# Patient Record
Sex: MALE | Race: WHITE | Employment: OTHER | URBAN - METROPOLITAN AREA
[De-identification: names, ages, dates, MRNs, and addresses within clinical notes are randomized per-mention and may not be internally consistent; named-entity substitution may affect disease eponyms.]

---

## 2020-07-27 DIAGNOSIS — M00.9 INFECTION OF LEFT KNEE (HCC): ICD-10-CM

## 2020-07-27 DIAGNOSIS — I96 GANGRENE OF LEFT FOOT (HCC): ICD-10-CM

## 2020-07-27 DIAGNOSIS — L03.116 CELLULITIS OF LEFT KNEE: ICD-10-CM

## 2020-07-27 DIAGNOSIS — I73.9 PERIPHERAL VASCULAR DISEASE (HCC): ICD-10-CM

## 2020-07-27 PROBLEM — E11.9 DIABETES (HCC): Status: ACTIVE | Noted: 2020-07-27

## 2020-07-27 PROBLEM — I10 HYPERTENSION: Status: ACTIVE | Noted: 2020-07-27

## 2020-07-27 RX ORDER — ATORVASTATIN CALCIUM 80 MG/1
80 TABLET, FILM COATED ORAL DAILY
COMMUNITY

## 2020-07-27 RX ORDER — FINASTERIDE 1 MG/1
1 TABLET, FILM COATED ORAL DAILY
COMMUNITY

## 2020-07-27 RX ORDER — M-VIT,TX,IRON,MINS/CALC/FOLIC 27MG-0.4MG
1 TABLET ORAL DAILY
COMMUNITY

## 2020-07-27 RX ORDER — ASCORBIC ACID 500 MG
1000 TABLET ORAL 2 TIMES DAILY
COMMUNITY

## 2020-07-27 RX ORDER — ASPIRIN 81 MG/1
81 TABLET ORAL DAILY
COMMUNITY

## 2020-07-27 RX ORDER — GLIPIZIDE AND METFORMIN HCL 5; 500 MG/1; MG/1
1 TABLET, FILM COATED ORAL
COMMUNITY

## 2020-07-27 RX ORDER — IBUPROFEN 600 MG/1
600 TABLET ORAL EVERY 8 HOURS PRN
COMMUNITY

## 2020-07-27 RX ORDER — LISINOPRIL AND HYDROCHLOROTHIAZIDE 12.5; 1 MG/1; MG/1
1 TABLET ORAL DAILY
COMMUNITY

## 2020-07-27 RX ORDER — TAMSULOSIN HYDROCHLORIDE 0.4 MG/1
0.4 CAPSULE ORAL NIGHTLY
COMMUNITY

## 2023-04-15 ENCOUNTER — APPOINTMENT (OUTPATIENT)
Dept: GENERAL RADIOLOGY | Age: 78
DRG: 918 | End: 2023-04-15
Payer: MEDICARE

## 2023-04-15 ENCOUNTER — HOSPITAL ENCOUNTER (INPATIENT)
Age: 78
LOS: 1 days | Discharge: HOME | DRG: 918 | End: 2023-04-17
Attending: EMERGENCY MEDICINE | Admitting: STUDENT IN AN ORGANIZED HEALTH CARE EDUCATION/TRAINING PROGRAM
Payer: MEDICARE

## 2023-04-15 DIAGNOSIS — N17.9 AKI (ACUTE KIDNEY INJURY) (HCC): ICD-10-CM

## 2023-04-15 DIAGNOSIS — R55 SYNCOPE AND COLLAPSE: Primary | ICD-10-CM

## 2023-04-15 DIAGNOSIS — J90 PLEURAL EFFUSION: ICD-10-CM

## 2023-04-15 LAB
ABSOLUTE EOS #: 0.18 K/UL (ref 0–0.44)
ABSOLUTE IMMATURE GRANULOCYTE: 0.05 K/UL (ref 0–0.3)
ABSOLUTE LYMPH #: 1.1 K/UL (ref 1.1–3.7)
ABSOLUTE MONO #: 0.75 K/UL (ref 0.1–1.2)
ALBUMIN SERPL-MCNC: 3.8 G/DL (ref 3.5–5.2)
ALBUMIN/GLOBULIN RATIO: 1.5 (ref 1–2.5)
ALP SERPL-CCNC: 87 U/L (ref 40–129)
ALT SERPL-CCNC: 9 U/L (ref 5–41)
ANION GAP SERPL CALCULATED.3IONS-SCNC: 12 MMOL/L (ref 9–17)
AST SERPL-CCNC: 15 U/L
BASOPHILS # BLD: 0 % (ref 0–2)
BASOPHILS ABSOLUTE: <0.03 K/UL (ref 0–0.2)
BILIRUB SERPL-MCNC: 0.3 MG/DL (ref 0.3–1.2)
BNP SERPL-MCNC: 1046 PG/ML
BUN SERPL-MCNC: 22 MG/DL (ref 8–23)
CALCIUM SERPL-MCNC: 8.4 MG/DL (ref 8.6–10.4)
CHLORIDE SERPL-SCNC: 101 MMOL/L (ref 98–107)
CO2 SERPL-SCNC: 23 MMOL/L (ref 20–31)
CREAT SERPL-MCNC: 1.63 MG/DL (ref 0.7–1.2)
D DIMER BLD IA.RAPID-MCNC: <0.27 MG/L FEU (ref 0–0.57)
EOSINOPHILS RELATIVE PERCENT: 2 % (ref 1–4)
GFR SERPL CREATININE-BSD FRML MDRD: 43 ML/MIN/1.73M2
GLUCOSE BLD-MCNC: 100 MG/DL (ref 75–110)
GLUCOSE SERPL-MCNC: 188 MG/DL (ref 70–99)
HCT VFR BLD AUTO: 39.4 % (ref 40.7–50.3)
HGB BLD-MCNC: 12.9 G/DL (ref 13–17)
IMMATURE GRANULOCYTES: 1 %
LYMPHOCYTES # BLD: 14 % (ref 24–43)
MAGNESIUM SERPL-MCNC: 2.2 MG/DL (ref 1.6–2.6)
MCH RBC QN AUTO: 30.8 PG (ref 25.2–33.5)
MCHC RBC AUTO-ENTMCNC: 32.7 G/DL (ref 28.4–34.8)
MCV RBC AUTO: 94 FL (ref 82.6–102.9)
MONOCYTES # BLD: 10 % (ref 3–12)
NRBC AUTOMATED: 0 PER 100 WBC
PDW BLD-RTO: 12.2 % (ref 11.8–14.4)
PLATELET # BLD AUTO: 249 K/UL (ref 138–453)
PMV BLD AUTO: 10.7 FL (ref 8.1–13.5)
POTASSIUM SERPL-SCNC: 4.4 MMOL/L (ref 3.7–5.3)
PROT SERPL-MCNC: 6.3 G/DL (ref 6.4–8.3)
RBC # BLD: 4.19 M/UL (ref 4.21–5.77)
SEG NEUTROPHILS: 73 % (ref 36–65)
SEGMENTED NEUTROPHILS ABSOLUTE COUNT: 5.65 K/UL (ref 1.5–8.1)
SODIUM SERPL-SCNC: 136 MMOL/L (ref 135–144)
T4 FREE SERPL-MCNC: 1.2 NG/DL (ref 0.9–1.7)
TROPONIN I SERPL DL<=0.01 NG/ML-MCNC: 38 NG/L (ref 0–22)
TROPONIN I SERPL DL<=0.01 NG/ML-MCNC: 45 NG/L (ref 0–22)
TSH SERPL-ACNC: 5.56 UIU/ML (ref 0.3–5)
WBC # BLD AUTO: 7.8 K/UL (ref 3.5–11.3)

## 2023-04-15 PROCEDURE — 83880 ASSAY OF NATRIURETIC PEPTIDE: CPT

## 2023-04-15 PROCEDURE — 84439 ASSAY OF FREE THYROXINE: CPT

## 2023-04-15 PROCEDURE — 85025 COMPLETE CBC W/AUTO DIFF WBC: CPT

## 2023-04-15 PROCEDURE — 82947 ASSAY GLUCOSE BLOOD QUANT: CPT

## 2023-04-15 PROCEDURE — 84443 ASSAY THYROID STIM HORMONE: CPT

## 2023-04-15 PROCEDURE — 85379 FIBRIN DEGRADATION QUANT: CPT

## 2023-04-15 PROCEDURE — 93005 ELECTROCARDIOGRAM TRACING: CPT | Performed by: STUDENT IN AN ORGANIZED HEALTH CARE EDUCATION/TRAINING PROGRAM

## 2023-04-15 PROCEDURE — 71046 X-RAY EXAM CHEST 2 VIEWS: CPT

## 2023-04-15 PROCEDURE — 80053 COMPREHEN METABOLIC PANEL: CPT

## 2023-04-15 PROCEDURE — 84484 ASSAY OF TROPONIN QUANT: CPT

## 2023-04-15 PROCEDURE — 99285 EMERGENCY DEPT VISIT HI MDM: CPT

## 2023-04-15 PROCEDURE — 83735 ASSAY OF MAGNESIUM: CPT

## 2023-04-15 ASSESSMENT — PAIN - FUNCTIONAL ASSESSMENT: PAIN_FUNCTIONAL_ASSESSMENT: NONE - DENIES PAIN

## 2023-04-16 ENCOUNTER — APPOINTMENT (OUTPATIENT)
Dept: CT IMAGING | Age: 78
DRG: 918 | End: 2023-04-16
Payer: MEDICARE

## 2023-04-16 PROBLEM — I10 ESSENTIAL HYPERTENSION: Status: ACTIVE | Noted: 2023-04-16

## 2023-04-16 PROBLEM — Z86.79 HISTORY OF ATRIAL FIBRILLATION: Status: ACTIVE | Noted: 2023-04-16

## 2023-04-16 PROBLEM — J43.2 CENTRILOBULAR EMPHYSEMA (HCC): Status: ACTIVE | Noted: 2023-04-16

## 2023-04-16 PROBLEM — R55 SYNCOPE AND COLLAPSE: Status: ACTIVE | Noted: 2023-04-16

## 2023-04-16 LAB
ABSOLUTE RETIC #: 0.06 M/UL (ref 0.03–0.08)
AMPHETAMINE SCREEN URINE: NEGATIVE
BACTERIA: ABNORMAL
BARBITURATE SCREEN URINE: NEGATIVE
BENZODIAZEPINE SCREEN, URINE: NEGATIVE
BILIRUBIN URINE: NEGATIVE
BNP SERPL-MCNC: 762 PG/ML
CANNABINOID SCREEN URINE: POSITIVE
COCAINE METABOLITE, URINE: POSITIVE
COLOR: YELLOW
EPITHELIAL CELLS UA: ABNORMAL /HPF (ref 0–5)
FENTANYL URINE: NEGATIVE
FERRITIN SERPL-MCNC: 91 NG/ML (ref 30–400)
GLUCOSE BLD-MCNC: 136 MG/DL (ref 75–110)
GLUCOSE BLD-MCNC: 159 MG/DL (ref 75–110)
GLUCOSE BLD-MCNC: 199 MG/DL (ref 75–110)
GLUCOSE BLD-MCNC: 228 MG/DL (ref 75–110)
GLUCOSE UR STRIP.AUTO-MCNC: ABNORMAL MG/DL
IMMATURE RETIC FRACT: 8.6 % (ref 2.7–18.3)
IRON SATURATION: 30 % (ref 20–55)
IRON SERPL-MCNC: 79 UG/DL (ref 59–158)
KETONES UR STRIP.AUTO-MCNC: NEGATIVE MG/DL
LEUKOCYTE ESTERASE UR QL STRIP.AUTO: ABNORMAL
METHADONE SCREEN, URINE: NEGATIVE
NITRITE UR QL STRIP.AUTO: POSITIVE
OPIATES, URINE: NEGATIVE
OXYCODONE SCREEN URINE: NEGATIVE
PHENCYCLIDINE, URINE: NEGATIVE
PROT UR STRIP.AUTO-MCNC: 6.5 MG/DL (ref 5–8)
PROT UR STRIP.AUTO-MCNC: ABNORMAL MG/DL
RBC CLUMPS #/AREA URNS AUTO: ABNORMAL /HPF (ref 0–4)
RETIC HEMOGLOBIN: 35.8 PG (ref 28.2–35.7)
RETICS/RBC NFR AUTO: 1.3 % (ref 0.5–1.9)
SPECIFIC GRAVITY UA: 1.03 (ref 1–1.03)
TEST INFORMATION: ABNORMAL
TIBC SERPL-MCNC: 264 UG/DL (ref 250–450)
TURBIDITY: ABNORMAL
UNSATURATED IRON BINDING CAPACITY: 185 UG/DL (ref 112–347)
URINE HGB: ABNORMAL
UROBILINOGEN, URINE: NORMAL
WBC UA: ABNORMAL /HPF (ref 0–5)

## 2023-04-16 PROCEDURE — 70450 CT HEAD/BRAIN W/O DYE: CPT

## 2023-04-16 PROCEDURE — 83880 ASSAY OF NATRIURETIC PEPTIDE: CPT

## 2023-04-16 PROCEDURE — 6370000000 HC RX 637 (ALT 250 FOR IP): Performed by: STUDENT IN AN ORGANIZED HEALTH CARE EDUCATION/TRAINING PROGRAM

## 2023-04-16 PROCEDURE — 36415 COLL VENOUS BLD VENIPUNCTURE: CPT

## 2023-04-16 PROCEDURE — 81001 URINALYSIS AUTO W/SCOPE: CPT

## 2023-04-16 PROCEDURE — 2580000003 HC RX 258: Performed by: NURSE PRACTITIONER

## 2023-04-16 PROCEDURE — 2060000000 HC ICU INTERMEDIATE R&B

## 2023-04-16 PROCEDURE — 82728 ASSAY OF FERRITIN: CPT

## 2023-04-16 PROCEDURE — 83540 ASSAY OF IRON: CPT

## 2023-04-16 PROCEDURE — 82947 ASSAY GLUCOSE BLOOD QUANT: CPT

## 2023-04-16 PROCEDURE — 87186 SC STD MICRODIL/AGAR DIL: CPT

## 2023-04-16 PROCEDURE — 6360000002 HC RX W HCPCS: Performed by: NURSE PRACTITIONER

## 2023-04-16 PROCEDURE — 87077 CULTURE AEROBIC IDENTIFY: CPT

## 2023-04-16 PROCEDURE — 85045 AUTOMATED RETICULOCYTE COUNT: CPT

## 2023-04-16 PROCEDURE — 99222 1ST HOSP IP/OBS MODERATE 55: CPT | Performed by: STUDENT IN AN ORGANIZED HEALTH CARE EDUCATION/TRAINING PROGRAM

## 2023-04-16 PROCEDURE — 93005 ELECTROCARDIOGRAM TRACING: CPT | Performed by: NURSE PRACTITIONER

## 2023-04-16 PROCEDURE — 83550 IRON BINDING TEST: CPT

## 2023-04-16 PROCEDURE — 87086 URINE CULTURE/COLONY COUNT: CPT

## 2023-04-16 PROCEDURE — 87040 BLOOD CULTURE FOR BACTERIA: CPT

## 2023-04-16 PROCEDURE — 80307 DRUG TEST PRSMV CHEM ANLYZR: CPT

## 2023-04-16 RX ORDER — SODIUM CHLORIDE 9 MG/ML
INJECTION, SOLUTION INTRAVENOUS CONTINUOUS
Status: DISCONTINUED | OUTPATIENT
Start: 2023-04-16 | End: 2023-04-16

## 2023-04-16 RX ORDER — INSULIN LISPRO 100 [IU]/ML
0-8 INJECTION, SOLUTION INTRAVENOUS; SUBCUTANEOUS
Status: DISCONTINUED | OUTPATIENT
Start: 2023-04-16 | End: 2023-04-17 | Stop reason: HOSPADM

## 2023-04-16 RX ORDER — IPRATROPIUM BROMIDE AND ALBUTEROL SULFATE 2.5; .5 MG/3ML; MG/3ML
1 SOLUTION RESPIRATORY (INHALATION) EVERY 4 HOURS PRN
Status: DISCONTINUED | OUTPATIENT
Start: 2023-04-16 | End: 2023-04-17 | Stop reason: HOSPADM

## 2023-04-16 RX ORDER — FLUDROCORTISONE ACETATE 0.1 MG/1
0.1 TABLET ORAL DAILY
Status: DISCONTINUED | OUTPATIENT
Start: 2023-04-16 | End: 2023-04-17 | Stop reason: HOSPADM

## 2023-04-16 RX ORDER — INSULIN LISPRO 100 [IU]/ML
0-4 INJECTION, SOLUTION INTRAVENOUS; SUBCUTANEOUS NIGHTLY
Status: DISCONTINUED | OUTPATIENT
Start: 2023-04-16 | End: 2023-04-17 | Stop reason: HOSPADM

## 2023-04-16 RX ORDER — POLYETHYLENE GLYCOL 3350 17 G/17G
17 POWDER, FOR SOLUTION ORAL DAILY PRN
Status: DISCONTINUED | OUTPATIENT
Start: 2023-04-16 | End: 2023-04-17 | Stop reason: HOSPADM

## 2023-04-16 RX ORDER — SODIUM CHLORIDE 9 MG/ML
INJECTION, SOLUTION INTRAVENOUS PRN
Status: DISCONTINUED | OUTPATIENT
Start: 2023-04-16 | End: 2023-04-17 | Stop reason: HOSPADM

## 2023-04-16 RX ORDER — DEXTROSE MONOHYDRATE 100 MG/ML
INJECTION, SOLUTION INTRAVENOUS CONTINUOUS PRN
Status: DISCONTINUED | OUTPATIENT
Start: 2023-04-16 | End: 2023-04-17 | Stop reason: HOSPADM

## 2023-04-16 RX ORDER — ENOXAPARIN SODIUM 100 MG/ML
40 INJECTION SUBCUTANEOUS DAILY
Status: DISCONTINUED | OUTPATIENT
Start: 2023-04-16 | End: 2023-04-17 | Stop reason: HOSPADM

## 2023-04-16 RX ORDER — ONDANSETRON 4 MG/1
4 TABLET, ORALLY DISINTEGRATING ORAL EVERY 8 HOURS PRN
Status: DISCONTINUED | OUTPATIENT
Start: 2023-04-16 | End: 2023-04-17 | Stop reason: HOSPADM

## 2023-04-16 RX ORDER — ACETAMINOPHEN 325 MG/1
650 TABLET ORAL EVERY 6 HOURS PRN
Status: DISCONTINUED | OUTPATIENT
Start: 2023-04-16 | End: 2023-04-17 | Stop reason: HOSPADM

## 2023-04-16 RX ORDER — SODIUM CHLORIDE 0.9 % (FLUSH) 0.9 %
10 SYRINGE (ML) INJECTION PRN
Status: DISCONTINUED | OUTPATIENT
Start: 2023-04-16 | End: 2023-04-17 | Stop reason: HOSPADM

## 2023-04-16 RX ORDER — ACETAMINOPHEN 650 MG/1
650 SUPPOSITORY RECTAL EVERY 6 HOURS PRN
Status: DISCONTINUED | OUTPATIENT
Start: 2023-04-16 | End: 2023-04-17 | Stop reason: HOSPADM

## 2023-04-16 RX ORDER — ATORVASTATIN CALCIUM 40 MG/1
40 TABLET, FILM COATED ORAL NIGHTLY
Status: DISCONTINUED | OUTPATIENT
Start: 2023-04-16 | End: 2023-04-17 | Stop reason: HOSPADM

## 2023-04-16 RX ORDER — POTASSIUM CHLORIDE 20 MEQ/1
40 TABLET, EXTENDED RELEASE ORAL PRN
Status: DISCONTINUED | OUTPATIENT
Start: 2023-04-16 | End: 2023-04-17 | Stop reason: HOSPADM

## 2023-04-16 RX ORDER — FINASTERIDE 5 MG/1
5 TABLET, FILM COATED ORAL DAILY
Status: DISCONTINUED | OUTPATIENT
Start: 2023-04-16 | End: 2023-04-17 | Stop reason: HOSPADM

## 2023-04-16 RX ORDER — POTASSIUM CHLORIDE 7.45 MG/ML
10 INJECTION INTRAVENOUS PRN
Status: DISCONTINUED | OUTPATIENT
Start: 2023-04-16 | End: 2023-04-17 | Stop reason: HOSPADM

## 2023-04-16 RX ORDER — SODIUM CHLORIDE 0.9 % (FLUSH) 0.9 %
5-40 SYRINGE (ML) INJECTION EVERY 12 HOURS SCHEDULED
Status: DISCONTINUED | OUTPATIENT
Start: 2023-04-16 | End: 2023-04-17 | Stop reason: HOSPADM

## 2023-04-16 RX ORDER — TAMSULOSIN HYDROCHLORIDE 0.4 MG/1
0.4 CAPSULE ORAL DAILY
Status: DISCONTINUED | OUTPATIENT
Start: 2023-04-16 | End: 2023-04-17 | Stop reason: HOSPADM

## 2023-04-16 RX ORDER — MAGNESIUM SULFATE 1 G/100ML
1000 INJECTION INTRAVENOUS PRN
Status: DISCONTINUED | OUTPATIENT
Start: 2023-04-16 | End: 2023-04-17 | Stop reason: HOSPADM

## 2023-04-16 RX ORDER — ONDANSETRON 2 MG/ML
4 INJECTION INTRAMUSCULAR; INTRAVENOUS EVERY 6 HOURS PRN
Status: DISCONTINUED | OUTPATIENT
Start: 2023-04-16 | End: 2023-04-17 | Stop reason: HOSPADM

## 2023-04-16 RX ADMIN — SODIUM CHLORIDE: 9 INJECTION, SOLUTION INTRAVENOUS at 04:09

## 2023-04-16 RX ADMIN — INSULIN LISPRO 2 UNITS: 100 INJECTION, SOLUTION INTRAVENOUS; SUBCUTANEOUS at 12:14

## 2023-04-16 RX ADMIN — TAMSULOSIN HYDROCHLORIDE 0.4 MG: 0.4 CAPSULE ORAL at 12:11

## 2023-04-16 RX ADMIN — FINASTERIDE 5 MG: 5 TABLET, FILM COATED ORAL at 12:11

## 2023-04-16 RX ADMIN — FLUDROCORTISONE ACETATE 0.1 MG: 0.1 TABLET ORAL at 16:40

## 2023-04-16 RX ADMIN — SODIUM CHLORIDE, PRESERVATIVE FREE 10 ML: 5 INJECTION INTRAVENOUS at 19:52

## 2023-04-16 RX ADMIN — DESMOPRESSIN ACETATE 40 MG: 0.2 TABLET ORAL at 19:52

## 2023-04-16 RX ADMIN — ENOXAPARIN SODIUM 40 MG: 100 INJECTION SUBCUTANEOUS at 08:47

## 2023-04-16 ASSESSMENT — ENCOUNTER SYMPTOMS
EYE PAIN: 0
SINUS PAIN: 0
COLOR CHANGE: 0
ABDOMINAL PAIN: 0
VOMITING: 0
ABDOMINAL DISTENTION: 0
COUGH: 0
BACK PAIN: 0
APNEA: 0
EYE REDNESS: 0
NAUSEA: 0
CHEST TIGHTNESS: 0
SHORTNESS OF BREATH: 0

## 2023-04-17 VITALS
HEART RATE: 72 BPM | RESPIRATION RATE: 18 BRPM | HEIGHT: 71 IN | DIASTOLIC BLOOD PRESSURE: 83 MMHG | TEMPERATURE: 97.3 F | SYSTOLIC BLOOD PRESSURE: 164 MMHG | WEIGHT: 210.1 LBS | BODY MASS INDEX: 29.41 KG/M2 | OXYGEN SATURATION: 98 %

## 2023-04-17 LAB
GLUCOSE BLD-MCNC: 167 MG/DL (ref 75–110)
GLUCOSE BLD-MCNC: 190 MG/DL (ref 75–110)
GLUCOSE BLD-MCNC: 238 MG/DL (ref 75–110)
MAGNESIUM SERPL-MCNC: 2.3 MG/DL (ref 1.6–2.6)

## 2023-04-17 PROCEDURE — 82947 ASSAY GLUCOSE BLOOD QUANT: CPT

## 2023-04-17 PROCEDURE — 99231 SBSQ HOSP IP/OBS SF/LOW 25: CPT | Performed by: STUDENT IN AN ORGANIZED HEALTH CARE EDUCATION/TRAINING PROGRAM

## 2023-04-17 PROCEDURE — 97165 OT EVAL LOW COMPLEX 30 MIN: CPT

## 2023-04-17 PROCEDURE — 6370000000 HC RX 637 (ALT 250 FOR IP): Performed by: STUDENT IN AN ORGANIZED HEALTH CARE EDUCATION/TRAINING PROGRAM

## 2023-04-17 PROCEDURE — 2580000003 HC RX 258: Performed by: NURSE PRACTITIONER

## 2023-04-17 PROCEDURE — 6360000002 HC RX W HCPCS: Performed by: NURSE PRACTITIONER

## 2023-04-17 PROCEDURE — 36415 COLL VENOUS BLD VENIPUNCTURE: CPT

## 2023-04-17 PROCEDURE — 83735 ASSAY OF MAGNESIUM: CPT

## 2023-04-17 RX ORDER — FINASTERIDE 5 MG/1
5 TABLET, FILM COATED ORAL DAILY
Qty: 30 TABLET | Refills: 3 | Status: SHIPPED | OUTPATIENT
Start: 2023-04-18

## 2023-04-17 RX ORDER — FLUDROCORTISONE ACETATE 0.1 MG/1
0.1 TABLET ORAL DAILY
Qty: 30 TABLET | Refills: 0 | Status: SHIPPED | OUTPATIENT
Start: 2023-04-18 | End: 2023-05-18

## 2023-04-17 RX ORDER — TAMSULOSIN HYDROCHLORIDE 0.4 MG/1
0.4 CAPSULE ORAL DAILY
Qty: 30 CAPSULE | Refills: 3 | Status: SHIPPED | OUTPATIENT
Start: 2023-04-18

## 2023-04-17 RX ORDER — ATORVASTATIN CALCIUM 40 MG/1
40 TABLET, FILM COATED ORAL NIGHTLY
Qty: 30 TABLET | Refills: 0 | Status: SHIPPED | OUTPATIENT
Start: 2023-04-17 | End: 2023-05-17

## 2023-04-17 RX ADMIN — FINASTERIDE 5 MG: 5 TABLET, FILM COATED ORAL at 09:02

## 2023-04-17 RX ADMIN — INSULIN LISPRO 2 UNITS: 100 INJECTION, SOLUTION INTRAVENOUS; SUBCUTANEOUS at 12:39

## 2023-04-17 RX ADMIN — TAMSULOSIN HYDROCHLORIDE 0.4 MG: 0.4 CAPSULE ORAL at 09:02

## 2023-04-17 RX ADMIN — ENOXAPARIN SODIUM 40 MG: 100 INJECTION SUBCUTANEOUS at 09:02

## 2023-04-17 RX ADMIN — FLUDROCORTISONE ACETATE 0.1 MG: 0.1 TABLET ORAL at 09:01

## 2023-04-17 RX ADMIN — SODIUM CHLORIDE, PRESERVATIVE FREE 10 ML: 5 INJECTION INTRAVENOUS at 10:40

## 2023-04-17 ASSESSMENT — ENCOUNTER SYMPTOMS
EYE REDNESS: 0
EYE PAIN: 0
CHEST TIGHTNESS: 0
APNEA: 0
ABDOMINAL DISTENTION: 0
SINUS PAIN: 0
ABDOMINAL PAIN: 0
COLOR CHANGE: 0

## 2023-04-17 NOTE — PLAN OF CARE
Problem: Discharge Planning  Goal: Discharge to home or other facility with appropriate resources  Outcome: Progressing     Problem: Safety - Adult  Goal: Free from fall injury  Outcome: Progressing     Problem: Cardiovascular - Adult  Goal: Maintains optimal cardiac output and hemodynamic stability  Outcome: Progressing  Goal: Absence of cardiac dysrhythmias or at baseline  Outcome: Progressing     Problem: Musculoskeletal - Adult  Goal: Return mobility to safest level of function  Outcome: Progressing  Goal: Maintain proper alignment of affected body part  Outcome: Progressing  Goal: Return ADL status to a safe level of function  Outcome: Progressing     Problem: Neurosensory - Adult  Goal: Achieves stable or improved neurological status  Outcome: Progressing     Problem: Respiratory - Adult  Goal: Achieves optimal ventilation and oxygenation  Outcome: Progressing     Problem: Metabolic/Fluid and Electrolytes - Adult  Goal: Electrolytes maintained within normal limits  Outcome: Progressing

## 2023-04-17 NOTE — PROGRESS NOTES
Physician Progress Note      Ramiro Hernandez  CSN #:                  [id number]  :                       1945  ADMIT DATE:       4/15/2023 9:59 PM  [address] [place] DATE:  RESPONDING  PROVIDER #:        Maia Nicole MD          QUERY TEXT:    Patient admitted with syncope. noted to have had alcoholic drink and used   cocaine earlier in the day. co morbdity of DM type 2. blood glucose was 147 at   time of syncopal event. since admission glucose range of 100-238. ordered   compression stocking and florinef and was also given iv fluids at 75 per hour   . Please clarify one of the following    The medical record reflects the following:  Risk Factors: age, alcohol cocaine and cannabinoid use, DM  Clinical Indicators:  admitted with syncope. noted to have had alcoholic drink   and used cocaine earlier in the day. co morbdity of DM type 2. blood glucose   was 147 at time of syncopal event. since admission glucose range of 100-238. ordered compression stocking and florinef and was also given iv fluids at 75   per hour  Treatment: iv fluids, ct brain, florinef, compression stockings    Thank Ulla Babinski RN BSN  CCDS  Email [email or web address]  Cell [phone]  office hours M-F 6am to 2:30pm  Options provided:  -- Syncope due to orthostatic hypotension secondary to diabetic autonomic   neuropathy  -- Syncope due to toxic effects of cocaine and cannabinoid  -- Syncope due to dehydration  -- syncope due to dehydration complicated by alcohol, cannabinoid and  cocaine   toxic effects  -- Other - I will add my own diagnosis  -- Disagree - Not applicable / Not valid  -- Disagree - Clinically unable to determine / Unknown  -- Refer to Clinical Documentation Reviewer    PROVIDER RESPONSE TEXT:    This patient has syncope due to dehydration complicated by alcohol,   cannabinoid and cocaine toxic effects    Query created by:  Walter Stockton on 2023 12:21 PM      Electronically signed by:
Past Medical History:   has no past medical history on file. Social History:        Family History: History reviewed. No pertinent family history. Vitals:  BP (!) 150/75   Pulse 87   Temp 97.7 °F (36.5 °C) (Oral)   Resp 20   Ht 5' 11\" (1.803 m)   Wt 210 lb 1.6 oz (95.3 kg)   SpO2 96%   BMI 29.30 kg/m²   Temp (24hrs), Av.1 °F (36.7 °C), Min:97.7 °F (36.5 °C), Max:98.4 °F (36.9 °C)    Recent Labs     23  0747 23  1117   POCGLU 159* 199* 167* 238*       I/O (24Hr):     Intake/Output Summary (Last 24 hours) at 2023 1316  Last data filed at 2023 1040  Gross per 24 hour   Intake 110 ml   Output 700 ml   Net -590 ml       Labs:  Hematology:  Recent Labs     04/15/23  2221   WBC 7.8   RBC 4.19*   HGB 12.9*   HCT 39.4*   MCV 94.0   MCH 30.8   MCHC 32.7   RDW 12.2      MPV 10.7   DDIMER <0.27     Chemistry:  Recent Labs     04/15/23  2221 04/15/23  2319 04/16/23  0722 23  0613     --   --   --    K 4.4  --   --   --      --   --   --    CO2 23  --   --   --    GLUCOSE 188*  --   --   --    BUN 22  --   --   --    CREATININE 1.63*  --   --   --    MG 2.2  --   --  2.3   ANIONGAP 12  --   --   --    LABGLOM 43*  --   --   --    CALCIUM 8.4*  --   --   --    PROBNP 1,046*  --  762*  --    TROPHS 45* 38*  --   --      Recent Labs     04/15/23  2221 04/16/23  0756 23  1131 23  0747 23  1117   PROT 6.3*  --   --   --   --   --   --    LABALBU 3.8  --   --   --   --   --   --    TSH 5.56*  --   --   --   --   --   --    AST 15  --   --   --   --   --   --    ALT 9  --   --   --   --   --   --    ALKPHOS 87  --   --   --   --   --   --    BILITOT 0.3  --   --   --   --   --   --    POCGLU  --  136* 228* 159* 199* 167* 238*     ABG:No results found for: POCPH, PHART, PH, POCPCO2, GWC7DQT, PCO2, POCPO2, PO2ART, PO2, POCHCO3, AZY4XTV, HCO3, NBEA, PBEA, BEART, BE, THGBART, THB, [id number],
standing at sink)  UE Bathing: Independent  LE Bathing: Independent  UE Dressing: Independent  LE Dressing: Independent (pt performed LB dressing sitting unsupported at EOB; pt demonstrated figure four dressing technique to complete functional task)  Toileting: Independent     Bed mobility  Supine to Sit: Independent  Sit to Supine: Independent  Scooting: Independent  Bed Mobility Comments: HOB raised ~15* prior to attempting bed mobility; pt did not utilize bed rails throughout task    Transfers  Sit to stand: Independent  Stand to sit:  Independent    Vision  Vision: Impaired  Vision Exceptions: Wears glasses for distance;Wears glasses at all times  Hearing  Hearing: Within functional limits    Cognition  Overall Cognitive Status: [facility]  Orientation  Overall Orientation Status: Within Functional Limits    Education Given To: Patient  Education Provided: Role of Therapy;Plan of Care  Education Method: Demonstration;Verbal  Barriers to Learning: None  Education Outcome: Verbalized understanding;Demonstrated understanding    Hand Dominance  Hand Dominance: Right    AM-PAC Score  AM-[facility] Inpatient Daily Activity Raw Score: 24 (04/17/23 1112)  AM-PAC Inpatient ADL T-Scale Score : 57.54 (04/17/23 1112)  ADL Inpatient CMS 0-100% Score: 0 (04/17/23 1112)  ADL Inpatient CMS G-Code Modifier : [facility] (04/17/23 1112)    Therapy Time   Individual Concurrent Group Co-treatment   Time In 1031         Time Out 1046         Minutes 15           Denny Carter S/OT

## 2023-04-17 NOTE — DISCHARGE SUMMARY
Consultations:    Consults:     Final Specialist Recommendations/Findings:   IP CONSULT TO HOSPITALIST  IP CONSULT TO SOCIAL WORK      The patient was seen and examined on day of discharge and this discharge summary is in conjunction with any daily progress note from day of discharge. Discharge plan:     Disposition: Home    Physician Follow Up:     Meghna Reece MD  [address] [address]  [phone]    Schedule an appointment as soon as possible for a visit in 1 week(s)  follow-up post hospital discharge       Requiring Further Evaluation/Follow Up POST HOSPITALIZATION/Incidental Findings: Follow-up PCP. Continue home medications. Started on Florinef for orthostatic hypotension    Diet: cardiac diet    Activity: As tolerated    Instructions to Patient: Follow-up PCP. Continue home medications. Started on Florinef for orthostatic hypotension    Discharge Medications:      Medication List        START taking these medications      atorvastatin 40 MG tablet  Commonly known as: LIPITOR  Take 1 tablet by mouth nightly     finasteride 5 MG tablet  Commonly known as: PROSCAR  Take 1 tablet by mouth daily  Start taking on: April 18, 2023     fludrocortisone 0.1 MG tablet  Commonly known as: FLORINEF  Take 1 tablet by mouth daily  Start taking on: April 18, 2023     tamsulosin 0.4 MG capsule  Commonly known as: FLOMAX  Take 1 capsule by mouth daily  Start taking on: April 18, 2023               Where to Get Your Medications        These medications were sent to [address] [place] [address] [address], [facility]      Phone: [phone]   atorvastatin 40 MG tablet  finasteride 5 MG tablet  fludrocortisone 0.1 MG tablet  tamsulosin 0.4 MG capsule         No discharge procedures on file.     Time Spent on discharge is  31 mins in patient examination, evaluation, counseling as well as medication reconciliation,

## 2023-04-17 NOTE — PLAN OF CARE
Problem: Discharge Planning  Goal: Discharge to home or other facility with appropriate resources  Outcome: Adequate for Discharge     Problem: Safety - Adult  Goal: Free from fall injury  Outcome: Adequate for Discharge     Problem: Cardiovascular - Adult  Goal: Maintains optimal cardiac output and hemodynamic stability  Outcome: Adequate for Discharge  Goal: Absence of cardiac dysrhythmias or at baseline  Outcome: Adequate for Discharge     Problem: Musculoskeletal - Adult  Goal: Return mobility to safest level of function  Outcome: Adequate for Discharge  Goal: Maintain proper alignment of affected body part  Outcome: Adequate for Discharge  Goal: Return ADL status to a safe level of function  Outcome: Adequate for Discharge     Problem: Neurosensory - Adult  Goal: Achieves stable or improved neurological status  Outcome: Adequate for Discharge     Problem: Respiratory - Adult  Goal: Achieves optimal ventilation and oxygenation  Outcome: Adequate for Discharge     Problem: Metabolic/Fluid and Electrolytes - Adult  Goal: Electrolytes maintained within normal limits  Outcome: Adequate for Discharge

## 2023-04-17 NOTE — CARE COORDINATION
Case Management Assessment  Initial Evaluation    Date/Time of Evaluation: 4/17/2023 8:25AM  Assessment Completed by: Phylicia Zepeda RN    If patient is discharged prior to next notation, then this note serves as note for discharge by case management. Patient Name: Ailyn Hinson                   YOB: 1945  Diagnosis: Syncope and collapse [R55]  Pleural effusion [J90]  SAQIB (acute kidney injury) ([facility] [address] [N17.9]                   Date / Time: 4/15/2023  9:59 PM    Patient Admission Status: Inpatient   Readmission Risk (Low < 19, Mod (19-27), High > 27): Readmission Risk Score: 7.9    Current PCP: Deric Wong MD  PCP verified by CM? (P) Yes Jodi Helms MD)    Chart Reviewed: Yes      History Provided by: (P) Patient  Patient Orientation: (P) Alert and Oriented, Person, Place, Situation    Patient Cognition: (P) Alert    Hospitalization in the last 30 days (Readmission):  No    If yes, Readmission Assessment in CM Navigator will be completed. Advance Directives:      Code Status: Full Code   Patient's Primary Decision Maker is: (P)  (states has DPOA with daughter Nadeen Carrington listed)      Discharge Planning:    Patient lives with: (P) Alone Type of Home: (P) Apartment  Primary Care Giver: (P) Self  Patient Support Systems include: (P) Children   Current Financial resources: (P) Medicare  Current community resources: (P) None  Current services prior to admission: (P) Durable Medical Equipment            Current DME: (P) Glucometer, Cane (rollator)            Type of Home Care services:  (P) None    ADLS  Prior functional level: (P) Independent in ADLs/IADLs  Current functional level: (P) Independent in ADLs/IADLs    PT AM-PAC:   /24  OT AM-PAC:   /24    Family can provide assistance at DC: Would you like Case Management to discuss the discharge plan with any other family members/significant others, and if so, who?     Plans to Return to Present Housing: (P) Yes  Other Identified Issues/Barriers

## 2023-04-18 LAB
EKG ATRIAL RATE: 78 BPM
EKG ATRIAL RATE: 80 BPM
EKG P AXIS: 71 DEGREES
EKG P AXIS: 76 DEGREES
EKG P-R INTERVAL: 222 MS
EKG P-R INTERVAL: 246 MS
EKG Q-T INTERVAL: 390 MS
EKG Q-T INTERVAL: 422 MS
EKG QRS DURATION: 110 MS
EKG QRS DURATION: 98 MS
EKG QTC CALCULATION (BAZETT): 449 MS
EKG QTC CALCULATION (BAZETT): 481 MS
EKG R AXIS: 13 DEGREES
EKG R AXIS: 6 DEGREES
EKG T AXIS: -28 DEGREES
EKG T AXIS: 70 DEGREES
EKG VENTRICULAR RATE: 78 BPM
EKG VENTRICULAR RATE: 80 BPM
MICROORGANISM SPEC CULT: ABNORMAL
SPECIMEN DESCRIPTION: ABNORMAL

## 2023-04-21 LAB
MICROORGANISM SPEC CULT: NORMAL
MICROORGANISM SPEC CULT: NORMAL
SERVICE CMNT-IMP: NORMAL
SERVICE CMNT-IMP: NORMAL
SPECIMEN DESCRIPTION: NORMAL
SPECIMEN DESCRIPTION: NORMAL